# Patient Record
Sex: FEMALE | Race: WHITE
[De-identification: names, ages, dates, MRNs, and addresses within clinical notes are randomized per-mention and may not be internally consistent; named-entity substitution may affect disease eponyms.]

---

## 2022-06-16 ENCOUNTER — HOSPITAL ENCOUNTER (EMERGENCY)
Dept: HOSPITAL 26 - MED | Age: 27
LOS: 1 days | Discharge: HOME | End: 2022-06-17
Payer: COMMERCIAL

## 2022-06-16 VITALS — DIASTOLIC BLOOD PRESSURE: 82 MMHG | SYSTOLIC BLOOD PRESSURE: 124 MMHG

## 2022-06-16 VITALS — BODY MASS INDEX: 26.66 KG/M2 | HEIGHT: 65 IN | WEIGHT: 160 LBS

## 2022-06-16 DIAGNOSIS — F32.9: ICD-10-CM

## 2022-06-16 DIAGNOSIS — E78.5: ICD-10-CM

## 2022-06-16 DIAGNOSIS — M54.50: Primary | ICD-10-CM

## 2022-06-16 DIAGNOSIS — Z90.49: ICD-10-CM

## 2022-06-16 DIAGNOSIS — Z79.899: ICD-10-CM

## 2022-06-16 PROCEDURE — 96372 THER/PROPH/DIAG INJ SC/IM: CPT

## 2022-06-16 PROCEDURE — 81002 URINALYSIS NONAUTO W/O SCOPE: CPT

## 2022-06-16 PROCEDURE — 72131 CT LUMBAR SPINE W/O DYE: CPT

## 2022-06-16 PROCEDURE — 81025 URINE PREGNANCY TEST: CPT

## 2022-06-16 PROCEDURE — 99285 EMERGENCY DEPT VISIT HI MDM: CPT

## 2022-06-16 NOTE — NUR
27 Y/O F BIBA FOR BACK PAIN. PAIN IS CHRONIC X1 YEAR BUT WAS EXACERBERATED BY 
CLEANING TODAY. PT STATES PAIN 10/10 AND IS DULL THAT RADIATES TO RIGHT LEG. PT 
STATES SHE TOOK TYLENOL FOR PAIN BUT NO RELIEF. PT STATES SHES GOTTEN XRAYS BUT 
NO FRACTURES FOUND. 

DENIES N/V/D; SKIN IS PINK/WARM/DRY; AAOX4, BED BOUND DUE TO PAIN.  HR EVEN AND 
REGULAR; PT DENIES ANY FEVER, CP, SOB, OR COUGH AT THIS TIME; 

PMH: HIGH CHOLESTEROL, DEPRESSION

MEDS: ZOLOFT, TRAZADONE, ASPIRIN

## 2022-06-17 VITALS — SYSTOLIC BLOOD PRESSURE: 106 MMHG | DIASTOLIC BLOOD PRESSURE: 70 MMHG

## 2022-06-17 NOTE — NUR
Patient appears to be resting comfortably in bed. Vital Signs within normal 
limits. Respirations even and unlabored. X2 SIDE RAILS UP FOR SAFETY

## 2022-06-17 NOTE — NUR
Patient discharged with v/s stable. Written and verbal after care instructions 
given and explained. 

Patient alert, oriented and verbalized understanding of instructions. 
Ambulatory with steady gait. All questions addressed prior to discharge. ID 
band removed. Patient advised to follow up with PMD. Rx of VALIUM/ NAPROSYN 
given.  Opportunity to ask questions provided and answered.